# Patient Record
Sex: FEMALE | Race: WHITE | NOT HISPANIC OR LATINO | Employment: OTHER | URBAN - METROPOLITAN AREA
[De-identification: names, ages, dates, MRNs, and addresses within clinical notes are randomized per-mention and may not be internally consistent; named-entity substitution may affect disease eponyms.]

---

## 2017-07-26 NOTE — PATIENT DISCUSSION
Dendritic Keratitis OS: PRESCRIBED Zirgan 0.15% gel 5x/day. RTC as directed for follow-up visit- FRIDAY F/U, SOONER IF SYMPTOMS INCREASE/PERSIST.

## 2017-07-28 NOTE — PATIENT DISCUSSION
Dendritic Keratitis OS: CONTINUE Zirgan 0.15% gel 5x/day. VALTREX 1GM TID x 7 DAYS. RTC as directed for follow-up visit/SOONER IF SYMPTOMS INCREASE/PERSIST.

## 2017-07-28 NOTE — PATIENT DISCUSSION
New Prescription: Valtrex (valacyclovir): tablet: 1 g 1 tablet three times a day as directed by mouth 07-

## 2017-08-03 NOTE — PATIENT DISCUSSION
Dendritic Keratitis OS, HEALING: TAPER Zirgan 0.15% gel to 3x/day for 1 week then D/C. FINISH OUT VALTREX COURSE.   RTC as directed for follow-up visit/sooner if symptoms increase/persist.

## 2017-11-03 ENCOUNTER — ESTABLISHED PATIENT (OUTPATIENT)
Dept: URBAN - METROPOLITAN AREA CLINIC 39 | Facility: CLINIC | Age: 82
End: 2017-11-03

## 2017-11-03 DIAGNOSIS — H00.14: ICD-10-CM

## 2017-11-03 PROCEDURE — 99213 OFFICE O/P EST LOW 20 MIN: CPT

## 2017-11-03 ASSESSMENT — VISUAL ACUITY
OS_SC: 20/40-1
OD_SC: 20/25-2

## 2017-11-10 ENCOUNTER — ESTABLISHED PATIENT (OUTPATIENT)
Dept: URBAN - METROPOLITAN AREA CLINIC 39 | Facility: CLINIC | Age: 82
End: 2017-11-10

## 2017-11-10 DIAGNOSIS — H00.14: ICD-10-CM

## 2017-11-10 PROCEDURE — 11900 INJECT SKIN LESIONS </W 7: CPT

## 2017-11-10 PROCEDURE — 99213 OFFICE O/P EST LOW 20 MIN: CPT

## 2017-11-10 ASSESSMENT — VISUAL ACUITY
OD_SC: 20/30
OS_SC: 20/30-2

## 2018-11-12 NOTE — PATIENT DISCUSSION
A vitreous hemorrhage is a disorder of the eye in which bleeding occurs into the retinal tissue in the back of the eye. A vitreous hemorrhage can be caused by hypertension, retinal vein occlusion or diabetes mellitus.  Vitreous hemorrhages are diagnosed and managed by dilated examination in detail by ophthalmoscopy, fundus photography, or a dilated fundus exam.

## 2018-11-12 NOTE — PATIENT DISCUSSION
VITREOUS HEMORRHAGE, OD: TX OPTIONS DISCUSSED. PATIENT ELECTED TO CONTINUE WITH OBSERVATION ONLY AT THIS TIME. HEAD OF BED ELEVATION ENCOURAGED. RETURN FOR FOLLOW-UP AS SCHEDULED.

## 2018-11-26 NOTE — PATIENT DISCUSSION
RETINA IS ATTACHED OD: PVD OD; VITREOUS HEMORRHAGE RESOLVING; NO HOLES OR TEARS SEEN ON DILATED EXAM TODAY.  RETINAL DETACHMENT SIGNS AND SYMPTOMS REVIEWED

## 2020-03-12 NOTE — PATIENT DISCUSSION
HYDROXYCHLOROQUINE (PLAQUENIL) USE:  NO OCULAR TOXICITY OU. CONTINUE PLAQUENIL AS DIRECTED. SCHEDULE YEARLY HVF 10-2. ASSESSMENT OF COLOR PLATES DONE TODAY. PATIENT INSTRUCTED TO RETURN TO PCP.

## 2021-03-16 NOTE — PATIENT DISCUSSION
HYDROXYCHLOROQUINE (PLAQUENIL) USE:  MAC OCT AND COLOR PLATES DONE TODAY TO DOCUMENT. NO OCULAR TOXICITY OU. CONTINUE PLAQUENIL AS DIRECTED. SCHEDULE YEARLY HVF10-2. PATIENT INSTRUCTED TO RETURN TO PCP.

## 2021-08-03 ENCOUNTER — EST. PATIENT EMERGENCY (OUTPATIENT)
Dept: URBAN - METROPOLITAN AREA CLINIC 38 | Facility: CLINIC | Age: 86
End: 2021-08-03

## 2021-08-03 DIAGNOSIS — H16.223: ICD-10-CM

## 2021-08-03 DIAGNOSIS — H02.88A: ICD-10-CM

## 2021-08-03 DIAGNOSIS — H02.88B: ICD-10-CM

## 2021-08-03 PROCEDURE — 92012 INTRM OPH EXAM EST PATIENT: CPT

## 2021-08-03 PROCEDURE — A4262 TEMPORARY TEAR DUCT PLUG: HCPCS

## 2021-08-03 PROCEDURE — 68761Q PUNCTAL PLUG/QUINTESS DISSOLVABLE PLUG/EACH

## 2021-08-03 RX ORDER — CYCLOSPORINE 0.5 MG/ML: 1 EMULSION OPHTHALMIC TWICE A DAY

## 2021-08-03 RX ORDER — LOTEPREDNOL ETABONATE 5 MG/ML: 1 SUSPENSION/ DROPS OPHTHALMIC TWICE A DAY

## 2021-08-03 ASSESSMENT — VISUAL ACUITY
OD_SC: 20/25+2
OS_SC: 20/20

## 2021-08-03 ASSESSMENT — TONOMETRY
OS_IOP_MMHG: 13
OD_IOP_MMHG: 14

## 2022-02-04 ENCOUNTER — FOLLOW UP (OUTPATIENT)
Dept: URBAN - METROPOLITAN AREA CLINIC 38 | Facility: CLINIC | Age: 87
End: 2022-02-04

## 2022-02-04 DIAGNOSIS — H16.223: ICD-10-CM

## 2022-02-04 DIAGNOSIS — H02.88B: ICD-10-CM

## 2022-02-04 DIAGNOSIS — H02.88A: ICD-10-CM

## 2022-02-04 PROCEDURE — 92012 INTRM OPH EXAM EST PATIENT: CPT

## 2022-02-04 PROCEDURE — A4262 TEMPORARY TEAR DUCT PLUG: HCPCS

## 2022-02-04 PROCEDURE — 68761Q PUNCTAL PLUG/QUINTESS DISSOLVABLE PLUG/EACH

## 2022-02-04 ASSESSMENT — VISUAL ACUITY
OD_SC: 20/20
OS_SC: 20/25+2

## 2022-02-04 ASSESSMENT — TONOMETRY
OD_IOP_MMHG: 12
OS_IOP_MMHG: 13

## 2022-03-16 NOTE — PATIENT DISCUSSION
MAC OCT AND COLOR PLATES DONE TODAY TO DOCUMENT. NO OCULAR TOXICITY OU. CONTINUE PLAQUENIL AS DIRECTED. SCHEDULE YEARLY HVF10-2. PATIENT INSTRUCTED TO RETURN TO PCP.

## 2022-08-30 ENCOUNTER — COMPREHENSIVE EXAM (OUTPATIENT)
Dept: URBAN - METROPOLITAN AREA CLINIC 38 | Facility: CLINIC | Age: 87
End: 2022-08-30

## 2022-08-30 DIAGNOSIS — H16.223: ICD-10-CM

## 2022-08-30 DIAGNOSIS — H52.11: ICD-10-CM

## 2022-08-30 DIAGNOSIS — Z96.1: ICD-10-CM

## 2022-08-30 DIAGNOSIS — H02.88A: ICD-10-CM

## 2022-08-30 DIAGNOSIS — H02.88B: ICD-10-CM

## 2022-08-30 PROCEDURE — 92014 COMPRE OPH EXAM EST PT 1/>: CPT

## 2022-08-30 PROCEDURE — A4262 TEMPORARY TEAR DUCT PLUG: HCPCS

## 2022-08-30 PROCEDURE — 92015 DETERMINE REFRACTIVE STATE: CPT

## 2022-08-30 PROCEDURE — 68761Q PUNCTAL PLUG/QUINTESS DISSOLVABLE PLUG/EACH

## 2022-08-30 ASSESSMENT — VISUAL ACUITY
OD_SC: 20/20
OS_SC: 20/25+1
OS_SC: J6
OU_SC: J2
OU_SC: 20/20
OD_SC: J1

## 2022-08-30 ASSESSMENT — TONOMETRY
OD_IOP_MMHG: 13
OS_IOP_MMHG: 12

## 2023-05-19 ENCOUNTER — FOLLOW UP (OUTPATIENT)
Dept: URBAN - METROPOLITAN AREA CLINIC 38 | Facility: CLINIC | Age: 88
End: 2023-05-19

## 2023-05-19 DIAGNOSIS — Z96.1: ICD-10-CM

## 2023-05-19 DIAGNOSIS — H16.223: ICD-10-CM

## 2023-05-19 DIAGNOSIS — H02.88B: ICD-10-CM

## 2023-05-19 DIAGNOSIS — H02.88A: ICD-10-CM

## 2023-05-19 PROCEDURE — A4262 TEMPORARY TEAR DUCT PLUG: HCPCS

## 2023-05-19 PROCEDURE — 68761Q PUNCTAL PLUG/QUINTESS DISSOLVABLE PLUG/EACH

## 2023-05-19 PROCEDURE — 92012 INTRM OPH EXAM EST PATIENT: CPT

## 2023-05-19 ASSESSMENT — VISUAL ACUITY
OS_SC: 20/25
OD_SC: 20/20

## 2023-05-19 ASSESSMENT — TONOMETRY
OS_IOP_MMHG: 12
OD_IOP_MMHG: 14

## 2023-07-13 NOTE — PATIENT DISCUSSION
Reason for Call:  Appointment Request    Patient requesting this type of appt:  Preventive     Requested provider: Anni Sage    Reason patient unable to be scheduled: Not within requested timeframe    When does patient want to be seen/preferred time: 1-2 weeks    Comments: Pt is looking to schedule [facility], unable to see MD until 1/2024, wants to get in sooner.    Could we send this information to you in [facility] or would you prefer to receive a phone call?:   Patient would prefer a phone call   Okay to leave a detailed message?: Yes at Cell number on file:    Telephone Information:   Mobile [phone]       Call taken on 7/13/2023 at 4:40 PM by SELWYN DELACRUZ       [employer]:
